# Patient Record
Sex: MALE | Race: WHITE | NOT HISPANIC OR LATINO | Employment: UNEMPLOYED | URBAN - METROPOLITAN AREA
[De-identification: names, ages, dates, MRNs, and addresses within clinical notes are randomized per-mention and may not be internally consistent; named-entity substitution may affect disease eponyms.]

---
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
## 2023-01-12 ENCOUNTER — HOSPITAL ENCOUNTER (EMERGENCY)
Facility: HOSPITAL | Age: 18
Discharge: HOME | End: 2023-01-12
Attending: EMERGENCY MEDICINE
Payer: COMMERCIAL

## 2023-01-12 VITALS
WEIGHT: 140 LBS | RESPIRATION RATE: 18 BRPM | BODY MASS INDEX: 20.04 KG/M2 | HEART RATE: 87 BPM | HEIGHT: 70 IN | TEMPERATURE: 98 F | OXYGEN SATURATION: 100 % | SYSTOLIC BLOOD PRESSURE: 122 MMHG | DIASTOLIC BLOOD PRESSURE: 78 MMHG

## 2023-01-12 DIAGNOSIS — T14.90XA INJURY: ICD-10-CM

## 2023-01-12 DIAGNOSIS — S62.306A CLOSED DISPLACED FRACTURE OF FIFTH METACARPAL BONE OF RIGHT HAND, UNSPECIFIED PORTION OF METACARPAL, INITIAL ENCOUNTER: Primary | ICD-10-CM

## 2023-01-12 PROCEDURE — 29125 APPL SHORT ARM SPLINT STATIC: CPT | Mod: RT,ER

## 2023-01-12 PROCEDURE — 25000003 PHARM REV CODE 250: Mod: ER | Performed by: NURSE PRACTITIONER

## 2023-01-12 PROCEDURE — 99284 EMERGENCY DEPT VISIT MOD MDM: CPT | Mod: 25,ER

## 2023-01-12 RX ORDER — IBUPROFEN 600 MG/1
600 TABLET ORAL
Status: COMPLETED | OUTPATIENT
Start: 2023-01-12 | End: 2023-01-12

## 2023-01-12 RX ORDER — OXYCODONE AND ACETAMINOPHEN 5; 325 MG/1; MG/1
1 TABLET ORAL EVERY 6 HOURS PRN
Qty: 12 TABLET | Refills: 0 | Status: SHIPPED | OUTPATIENT
Start: 2023-01-12 | End: 2023-01-15

## 2023-01-12 RX ORDER — IBUPROFEN 600 MG/1
600 TABLET ORAL EVERY 6 HOURS PRN
Qty: 20 TABLET | Refills: 0 | Status: SHIPPED | OUTPATIENT
Start: 2023-01-12 | End: 2023-01-17

## 2023-01-12 RX ADMIN — IBUPROFEN 600 MG: 600 TABLET ORAL at 03:01

## 2023-01-12 NOTE — ED PROVIDER NOTES
Encounter Date: 1/12/2023    SCRIBE #1 NOTE: I, Renate Mclaughlin, am scribing for, and in the presence of,  CLARA Epstein. I have scribed the following portions of the note - Other sections scribed: HPI, ROS, PE.     History     Chief Complaint   Patient presents with    Hand Injury     Pt states he punched door this morning now has right hand pain, no obvious signs of trauma noted      This 17 y.o male, with a medical history of Ulcerative Colitis, presents to the ED s/p a right hand injury that occurred this AM. Pt reports that he got into an argument with his mother this morning and punched a door with his right hand. He states that he has since been experiencing pain and swelling to the right hand (mainly to the 3rd, 4th and 5th digits). The symptoms are acute, constant and moderate (5/10). Patient denies rash, fever, chest pain, SOB, numbness, weakness, tingling, abdominal pain, back pain, dysuria, hematuria, nausea, vomiting, diarrhea, or any other complaints.  Patient has not taken any medications for the symptoms. No alleviating/aggravating factors. No known medication allergies. Of note, pt reports occasional alcohol use. No tobacco or drug use.  Patient is right hand dominent    The history is provided by the patient.   Review of patient's allergies indicates:  No Known Allergies  Past Medical History:   Diagnosis Date    Colitis, ulcerative      History reviewed. No pertinent surgical history.  Family History   Problem Relation Age of Onset    No Known Problems Mother     No Known Problems Father      Social History     Tobacco Use    Smoking status: Former    Smokeless tobacco: Never   Substance Use Topics    Alcohol use: No     Review of Systems   Constitutional:  Negative for chills, fatigue and fever.   HENT:  Negative for congestion, ear pain, rhinorrhea and sore throat.    Eyes:  Negative for pain, discharge and redness.   Respiratory:  Negative for cough and shortness of breath.     Cardiovascular:  Negative for chest pain.   Gastrointestinal:  Negative for abdominal pain, diarrhea, nausea and vomiting.   Genitourinary:  Negative for dysuria.   Musculoskeletal:  Positive for arthralgias. Negative for back pain, neck pain and neck stiffness.        (+) right hand pain (mainly to the 3rd, 4th and 5th digits)   Skin:  Negative for rash.   Neurological:  Negative for dizziness, weakness, light-headedness, numbness and headaches.   Psychiatric/Behavioral:  Negative for confusion.      Physical Exam     Initial Vitals [01/12/23 1315]   BP Pulse Resp Temp SpO2   117/76 99 20 98.2 °F (36.8 °C) 99 %      MAP       --         Physical Exam    Nursing note and vitals reviewed.  Constitutional: Vital signs are normal. He appears well-developed. He is cooperative.  Non-toxic appearance. He does not appear ill.   HENT:   Head: Normocephalic and atraumatic.   Right Ear: External ear normal.   Left Ear: External ear normal.   Nose: Nose normal.   Mouth/Throat: Oropharynx is clear and moist.   Eyes: Conjunctivae are normal.   Neck:   Normal range of motion.  Cardiovascular:  Normal rate and regular rhythm.           Pulses:       Radial pulses are 2+ on the right side.   Pulmonary/Chest: Effort normal and breath sounds normal. He exhibits no tenderness.   Abdominal: Abdomen is soft. There is no abdominal tenderness.   Musculoskeletal:      Cervical back: Normal range of motion.      Comments: There is tenderness at the base of the 3rd, 4th, and 5th metacarpals with swelling; no erythema, bruising, or rash; skin intact; Normal range of motion with pain; normal strength and sensation; +2 radial pulse; capillary refill < 2 seconds; normal right wrist; no snuffbox tenderness     Neurological: He is alert and oriented to person, place, and time. GCS eye subscore is 4. GCS verbal subscore is 5. GCS motor subscore is 6.   Normal strength and sensation.   Skin: Skin is warm, dry and intact. Capillary refill takes  less than 2 seconds. No rash noted.   No erythema, bruising, or rash. Skin is intact.   Psychiatric: He has a normal mood and affect. His speech is normal and behavior is normal. Judgment and thought content normal.       ED Course   Orthopedic Injury    Date/Time: 1/12/2023 4:43 PM  Performed by: CLARA Dunbar  Authorized by: Crystal Dorsey DO     Location procedure was performed:  [facility] EMERGENCY DEPARTMENT  Injury:     Injury location:  Hand    Location details:  Right hand    Injury type:  Fracture    Fracture type: fifth metacarpal        Pre-procedure assessment:     Neurovascular status: Neurovascularly intact      Distal perfusion: normal      Neurological function: normal      Range of motion: normal        Selections made in this section will also lock the Injury type section above.:     Immobilization:  Sling    Splint type:  Ulnar gutter    Supplies used:  Ortho-Glass    Complications: No      Estimated blood loss (mL):  0    Specimens: No      Implants: No    Post-procedure assessment:     Neurovascular status: Neurovascularly intact      Distal perfusion: normal      Neurological function: normal      Range of motion: splinted      Patient tolerance:  Patient tolerated the procedure well with no immediate complications  Labs Reviewed - No data to display       Imaging Results              X-Ray Wrist Complete Right (Final result)  Result time 01/12/23 15:46:53      Final result by Kamran Monsivais MD (01/12/23 15:46:53)                   Impression:      Proximal 5th metacarpal acute fracture, as above.      Electronically signed by: Kamran Monsivais MD  Date:    01/12/2023  Time:    15:46               Narrative:    EXAMINATION:  XR WRIST COMPLETE 3 VIEWS RIGHT; XR HAND COMPLETE 3 VIEW RIGHT    CLINICAL HISTORY:  injury; Injury, unspecified, initial encounter    TECHNIQUE:  PA, lateral, and oblique views of the right wrist and right hand were performed.    COMPARISON:  None    FINDINGS:  Bones are well  mineralized.  Acute appearing fracture with mild comminution and impaction involving the proximal metaphysis of the 5th metacarpal with slight impaction.  No definite fracture planes extending to the articular surface at the adjacent CMC joint.  There is mild dorsal apex angulation at the fracture site.  No dislocation or destructive osseous process.  Carpus is intact.  No subcutaneous emphysema or radiodense retained foreign body.  Dislocation or destructive osseous process.  Joint spaces appear relatively maintained.  No subcutaneous emphysema or radiodense retained foreign body.                                       X-Ray Hand 3 view Right (Final result)  Result time 01/12/23 15:46:53      Final result by Kamran Monsivais MD (01/12/23 15:46:53)                   Impression:      Proximal 5th metacarpal acute fracture, as above.      Electronically signed by: Kamran Monsivais MD  Date:    01/12/2023  Time:    15:46               Narrative:    EXAMINATION:  XR WRIST COMPLETE 3 VIEWS RIGHT; XR HAND COMPLETE 3 VIEW RIGHT    CLINICAL HISTORY:  injury; Injury, unspecified, initial encounter    TECHNIQUE:  PA, lateral, and oblique views of the right wrist and right hand were performed.    COMPARISON:  None    FINDINGS:  Bones are well mineralized.  Acute appearing fracture with mild comminution and impaction involving the proximal metaphysis of the 5th metacarpal with slight impaction.  No definite fracture planes extending to the articular surface at the adjacent CMC joint.  There is mild dorsal apex angulation at the fracture site.  No dislocation or destructive osseous process.  Carpus is intact.  No subcutaneous emphysema or radiodense retained foreign body.  Dislocation or destructive osseous process.  Joint spaces appear relatively maintained.  No subcutaneous emphysema or radiodense retained foreign body.                                       Medications   ibuprofen tablet 600 mg (600 mg Oral Given 1/12/23 2586)            APC / Resident Notes:   This is an urgent evaluation of a 17 y.o. male that presents to the Emergency Department for right hand injury.  The patient is a non-toxic, afebrile, and well appearing male. On physical exam, there is  tenderness at the base of the 3rd, 4th, and 5th metacarpals with swelling; no erythema, bruising, or rash; skin intact; Normal range of motion with pain; normal strength and sensation; +2 radial pulse; capillary refill < 2 seconds; normal right wrist; no snuffbox tenderness     Vital Signs: 122/78, 98.2, 87, 18, 100%  Lab\Radiology\Other Procedure RESULTS: Xray showed an acute appearing fracture with mild comminution and impaction involving the proximal metaphysis of the 5th metacarpal with slight impaction    Given the above findings, my overall impression is right 5th metacarpal fracture. Given the above findings, I do not think the patient has dislocation, cellulitis, abscess, laceration.    During his stay in the ED, the patient has been given Ibuprofen, splint, sling with good relief of his symptoms. The patient will be discharged home with percocet. Additional home care recommendations include Ibuprofen, Hydration. The diagnosis, treatment plan, instructions for follow-up, strict return precautions, and reevaluation with his Ortho as well as ED return precautions have been discussed with the patient and he has verbalized an understanding of the information.  All questions or concerns from the patient have been addressed.     This case was discussed with my attending Dr. Dorsey who is in agreement with my assessment and plan.     Scribe Attestation:   Scribe #1: I performed the above scribed service and the documentation accurately describes the services I performed. I attest to the accuracy of the note.                 I, JOSE ALEJANDRO Epstein, personally performed the services described in this documentation. All medical record entries made by the scribe were at my direction and in my  presence. I have reviewed the chart and agree that the record reflects my personal performance and is accurate and complete.   Clinical Impression:   Final diagnoses:  [T14.90XA] Injury  [S62.306A] Closed displaced fracture of fifth metacarpal bone of right hand, unspecified portion of metacarpal, initial encounter (Primary)        ED Disposition Condition    Discharge Stable          ED Prescriptions       Medication Sig Dispense Start Date End Date Auth. Provider    oxyCODONE-acetaminophen (PERCOCET) 5-325 mg per tablet Take 1 tablet by mouth every 6 (six) hours as needed for Pain. 12 tablet 1/12/2023 1/15/2023 CLARA Dunbar    ibuprofen (ADVIL,MOTRIN) 600 MG tablet Take 1 tablet (600 mg total) by mouth every 6 (six) hours as needed for Pain. 20 tablet 1/12/2023 1/17/2023 CLARA Dunbar          Follow-up Information       Follow up With Specialties Details Why Contact Info    Colton Johnston Jr., MD Hand Surgery, Orthopedic Surgery Schedule an appointment as soon as possible for a visit in 2 days  [address] [facility] [address]  [address]  [facility] [address]  [phone]      Brian Gutierres MD Orthopedic Surgery Schedule an appointment as soon as possible for a visit in 2 days  [address] [facility]  [address]  [facility] [address]  [phone]      [facility] ED Emergency Medicine Go to  If symptoms worsen [address] [facility] [address] [facility] [address] [phone]             CLARA Dunbar  01/12/23 5452

## 2023-01-12 NOTE — Clinical Note
"Stanton"Harley Fajardo was seen and treated in our emergency department on 1/12/2023.  He may return to work on 01/16/2023.       If you have any questions or concerns, please don't hesitate to call.      CLARA Dunbar"

## 2023-01-12 NOTE — DISCHARGE INSTRUCTIONS
§ Please return to the Emergency Department for any new or worsening symptoms including: fever, chest pain, shortness of breath, loss of consciousness, dizziness, weakness, or any other concerns.     § Schedule an appointment for follow up with Orthopedics as soon as possible for a recheck of your symptoms. If you do not have one, contact the one listed on your discharge paperwork or call the [facility] Appointment Desk at [phone] to schedule an appointment.     § If you require follow up care from a specialist and are unable to schedule an appointment with them directly, please contact your Primary Care Provider on the next business day to set up a referral.      § Please take all medication as prescribed.

## 2023-04-05 ENCOUNTER — LAB VISIT (OUTPATIENT)
Dept: LAB | Facility: HOSPITAL | Age: 18
End: 2023-04-05
Attending: PEDIATRICS
Payer: COMMERCIAL

## 2023-04-05 ENCOUNTER — TELEPHONE (OUTPATIENT)
Dept: PEDIATRIC GASTROENTEROLOGY | Facility: CLINIC | Age: 18
End: 2023-04-05
Payer: COMMERCIAL

## 2023-04-05 DIAGNOSIS — K51.90 ULCERATIVE COLITIS WITHOUT COMPLICATIONS, UNSPECIFIED LOCATION: ICD-10-CM

## 2023-04-05 PROCEDURE — 83993 ASSAY FOR CALPROTECTIN FECAL: CPT | Performed by: PEDIATRICS

## 2023-04-05 NOTE — TELEPHONE ENCOUNTER
----- Message from Clifton Parker sent at 4/5/2023 12:12 PM CDT -----  Regarding: call back  Pt's dad call to speak with someone in regards to pt's rx requesting call back     Call

## 2023-04-05 NOTE — TELEPHONE ENCOUNTER
James- Confirmed current order has 8 refills left but does need PA.  Call forwarded to Aleks Fink- Completed PA via telephone with Aleks.    Approved 4/5/2024  [id number]

## 2023-04-05 NOTE — TELEPHONE ENCOUNTER
Call returned to alonzo as requested.  Alonzo states he received a letter from [facility] stating they do not believe Stanton no longer needs to be on medication and that they will no longer cover.  Inquired if alonzo had a copy of letter.  Alonzo says he will look for it.  Instructed to send to e-mail once found.  Provided e-mail address.      Will also call pharmacy to inquire also.

## 2023-04-05 NOTE — TELEPHONE ENCOUNTER
Called dad to inform that PA was needed and was completed and approved.  Dad v/u and states he will get delivery initiated.  Dad denies any other questions.

## 2023-04-12 LAB — CALPROTECTIN STL-MCNT: <27.1 MCG/G

## 2023-04-19 ENCOUNTER — OFFICE VISIT (OUTPATIENT)
Dept: URGENT CARE | Facility: CLINIC | Age: 18
End: 2023-04-19
Payer: COMMERCIAL

## 2023-04-19 VITALS
HEIGHT: 70 IN | RESPIRATION RATE: 18 BRPM | BODY MASS INDEX: 20.04 KG/M2 | HEART RATE: 74 BPM | TEMPERATURE: 98 F | SYSTOLIC BLOOD PRESSURE: 105 MMHG | OXYGEN SATURATION: 96 % | WEIGHT: 140 LBS | DIASTOLIC BLOOD PRESSURE: 62 MMHG

## 2023-04-19 DIAGNOSIS — J02.0 STREP PHARYNGITIS: Primary | ICD-10-CM

## 2023-04-19 DIAGNOSIS — J02.9 SORE THROAT: ICD-10-CM

## 2023-04-19 LAB
CTP QC/QA: YES
MOLECULAR STREP A: POSITIVE

## 2023-04-19 PROCEDURE — 99213 OFFICE O/P EST LOW 20 MIN: CPT | Mod: S$GLB,,,

## 2023-04-19 PROCEDURE — 87651 POCT STREP A MOLECULAR: ICD-10-PCS | Mod: QW,S$GLB,,

## 2023-04-19 PROCEDURE — 87651 STREP A DNA AMP PROBE: CPT | Mod: QW,S$GLB,,

## 2023-04-19 PROCEDURE — 99213 PR OFFICE/OUTPT VISIT, EST, LEVL III, 20-29 MIN: ICD-10-PCS | Mod: S$GLB,,,

## 2023-04-19 RX ORDER — AMOXICILLIN 500 MG/1
500 TABLET, FILM COATED ORAL EVERY 12 HOURS
Qty: 20 TABLET | Refills: 0 | Status: SHIPPED | OUTPATIENT
Start: 2023-04-19 | End: 2023-04-29

## 2023-04-19 NOTE — PATIENT INSTRUCTIONS
Take amoxicillin twice a day for 10 days for strep throat.  Magic mouthwash swish and spit every 4 hours as needed for sore throat.    What care is needed at home?   Ask your doctor what you need to do when you go home. Make sure you ask questions if you do not understand what the doctor says. This way you will know what you need to do.  Gargle with warm salt water a few times daily. Mix 1/2 teaspoon (2.5 grams) salt with a cup (240 mL) of warm water.  Use a cool mist humidifier to keep your throat moist.  Drink lots of water, juice, or broth.  Suck on ice chips or throat lozenges to ease pain.  Stop smoking. Talk to your doctor if you need help quitting. Stay away from those who are smoking.  - Rest.    - Drink plenty of fluids.    - Acetaminophen (tylenol) or Ibuprofen (advil,motrin) as directed as needed for fever/pain. Avoid tylenol if you have a history of liver disease. Do not take ibuprofen if you have a history of GI bleeding, kidney disease, or if you take blood thinners.     - Follow up with your PCP or specialty clinic as directed in the next 1-2 weeks if not improved or as needed.  You can call [phone] to schedule an appointment with the appropriate provider.    - Go to the ER or seek medical attention immediately if you develop new or worsening symptoms.     - You must understand that you have received an Urgent Care treatment only and that you may be released before all of your medical problems are known or treated.   - You, the patient, will arrange for follow up care as instructed.   - If your condition worsens or fails to improve we recommend that you receive another evaluation at the ER immediately or contact your PCP to discuss your concerns or return here.

## 2023-04-19 NOTE — PROGRESS NOTES
"Subjective:      Patient ID: Stanton Fajardo is a 17 y.o. male.    Vitals:  height is 5' 10" (1.778 m) and weight is 63.5 kg (140 lb). His oral temperature is 98.3 °F (36.8 °C). His blood pressure is 105/62 and his pulse is 74. His respiration is 18 and oxygen saturation is 96%.     Chief Complaint: Sore Throat    17-year-old male patient reports of a sore through x2 days.  Patient reports it is painful to swallow.  Patient also complains of nasal congestion.  Patient reports he is taking Advil for pain.  Patient denies any fever, GI complaints, shortness of breath, chest pain or any other associated symptoms.    Sore Throat   This is a new problem. Episode onset: 2 days ago. The problem has been unchanged. There has been no fever. The pain is at a severity of 4/10. The pain is moderate. Pertinent negatives include no abdominal pain, congestion, coughing, diarrhea, drooling, ear discharge, ear pain, headaches, hoarse voice, plugged ear sensation, neck pain, shortness of breath, stridor, swollen glands, trouble swallowing or vomiting. He has had no exposure to strep or mono. Treatments tried: advil. The treatment provided moderate relief.     Constitution: Negative for activity change, appetite change, chills and sweating.   HENT:  Positive for sore throat. Negative for ear pain, ear discharge, foreign body in ear, tinnitus, hearing loss, drooling, congestion and trouble swallowing.    Neck: Positive for painful lymph nodes. Negative for neck pain and neck stiffness.   Cardiovascular:  Negative for chest trauma, chest pain, leg swelling and palpitations.   Eyes:  Negative for eye trauma, foreign body in eye, eye discharge and eye itching.   Respiratory:  Negative for sleep apnea, chest tightness, cough, shortness of breath and stridor.    Gastrointestinal:  Negative for abdominal trauma, abdominal pain, vomiting and diarrhea.   Endocrine: hair loss, cold intolerance and heat intolerance.   Genitourinary:  Negative " for dysuria, frequency, urgency and urine decreased.   Musculoskeletal:  Negative for pain, trauma, joint pain and joint swelling.   Skin:  Negative for color change, pale, rash and wound.   Allergic/Immunologic: Negative for environmental allergies, seasonal allergies, food allergies and eczema.   Neurological:  Negative for dizziness, history of vertigo, light-headedness, passing out, headaches, disorientation and altered mental status.   Hematologic/Lymphatic: Positive for swollen lymph nodes. Negative for easy bruising/bleeding and trouble clotting. Does not bruise/bleed easily.   Psychiatric/Behavioral:  Negative for altered mental status, disorientation, confusion and agitation.     Objective:     Physical Exam   Constitutional: He is oriented to person, place, and time. He appears well-developed. He is cooperative.  Non-toxic appearance. He does not appear ill. No distress.   HENT:   Head: Normocephalic and atraumatic.   Ears:   Right Ear: Hearing, tympanic membrane, external ear and ear canal normal.   Left Ear: Hearing, tympanic membrane, external ear and ear canal normal.   Nose: Nose normal. No mucosal edema, rhinorrhea or nasal deformity. No epistaxis. Right sinus exhibits no maxillary sinus tenderness and no frontal sinus tenderness. Left sinus exhibits no maxillary sinus tenderness and no frontal sinus tenderness.   Mouth/Throat: Uvula is midline and mucous membranes are normal. No trismus in the jaw. Normal dentition. No uvula swelling. Posterior oropharyngeal erythema present. No oropharyngeal exudate or posterior oropharyngeal edema.   Eyes: Conjunctivae and lids are normal. No scleral icterus.   Neck: Trachea normal and phonation normal. Neck supple. No edema present. No erythema present. No neck rigidity present.   Cardiovascular: Normal rate, regular rhythm, normal heart sounds and normal pulses.   Pulmonary/Chest: Effort normal and breath sounds normal. No respiratory distress. He has no  decreased breath sounds. He has no rhonchi.   Abdominal: Normal appearance.   Musculoskeletal: Normal range of motion.         General: No deformity. Normal range of motion.   Neurological: He is alert and oriented to person, place, and time. He exhibits normal muscle tone. Coordination normal.   Skin: Skin is warm, dry, intact, not diaphoretic and not pale.   Psychiatric: His speech is normal and behavior is normal. Judgment and thought content normal.   Nursing note and vitals reviewed.    Assessment:     1. Strep pharyngitis    2. Sore throat        Plan:     Results for orders placed or performed in visit on 04/19/23   POCT Strep A, Molecular   Result Value Ref Range    Molecular Strep A, POC Positive (A) Negative     Acceptable Yes       Patient Instructions   Take amoxicillin twice a day for 10 days for strep throat.  Magic mouthwash swish and spit every 4 hours as needed for sore throat.    What care is needed at home?   Ask your doctor what you need to do when you go home. Make sure you ask questions if you do not understand what the doctor says. This way you will know what you need to do.  Gargle with warm salt water a few times daily. Mix 1/2 teaspoon (2.5 grams) salt with a cup (240 mL) of warm water.  Use a cool mist humidifier to keep your throat moist.  Drink lots of water, juice, or broth.  Suck on ice chips or throat lozenges to ease pain.  Stop smoking. Talk to your doctor if you need help quitting. Stay away from those who are smoking.  - Rest.    - Drink plenty of fluids.    - Acetaminophen (tylenol) or Ibuprofen (advil,motrin) as directed as needed for fever/pain. Avoid tylenol if you have a history of liver disease. Do not take ibuprofen if you have a history of GI bleeding, kidney disease, or if you take blood thinners.     - Follow up with your PCP or specialty clinic as directed in the next 1-2 weeks if not improved or as needed.  You can call [phone] to schedule an  appointment with the appropriate provider.    - Go to the ER or seek medical attention immediately if you develop new or worsening symptoms.     - You must understand that you have received an Urgent Care treatment only and that you may be released before all of your medical problems are known or treated.   - You, the patient, will arrange for follow up care as instructed.   - If your condition worsens or fails to improve we recommend that you receive another evaluation at the ER immediately or contact your PCP to discuss your concerns or return here.    Strep pharyngitis  -     amoxicillin (AMOXIL) 500 MG Tab; Take 1 tablet (500 mg total) by mouth every 12 (twelve) hours. for 10 days  Dispense: 20 tablet; Refill: 0    Sore throat  -     POCT Strep A, Molecular  -     diphenhydrAMINE-aluminum-magnesium hydroxide-simethicone-LIDOcaine HCl 2%; Swish and spit 15 mLs every 4 (four) hours as needed (Sore throat). 220ml total  Dispense: 220 each; Refill: 0

## 2023-06-18 DIAGNOSIS — K51.90 ULCERATIVE COLITIS WITHOUT COMPLICATIONS, UNSPECIFIED LOCATION: ICD-10-CM

## 2023-06-19 NOTE — TELEPHONE ENCOUNTER
Refill request received for MTX, pt last seen in 9/2022. Called to offer scheduling f/u.  No answer, LVM.

## 2023-08-17 RX ORDER — METHOTREXATE 2.5 MG/1
TABLET ORAL
Qty: 12 TABLET | Refills: 11 | Status: SHIPPED | OUTPATIENT
Start: 2023-08-17

## 2023-08-23 NOTE — TELEPHONE ENCOUNTER
Call returned to alonzo informing him that Dr. Gonzalez would actually need an appointment to reschedule.  Alonzo v/u.  Offered the first available of 2/11 at 3PM and alonzo accepts.  Confirmed that insurance on file was correct. Alonzo states that they have a new pharmacy that is [facility].  Bin # - [id number] and PCN # - [id number].  Alonzo states his next injection is due on 2/10 so he will need a pen to tie him over.  Informed him that I will let Dr. Goznalez know and she will send it in for him.  Alonzo v/u   [phone]

## 2023-09-20 DIAGNOSIS — K51.90 ULCERATIVE COLITIS WITHOUT COMPLICATIONS, UNSPECIFIED LOCATION: ICD-10-CM

## 2023-09-26 ENCOUNTER — TELEPHONE (OUTPATIENT)
Dept: PEDIATRIC GASTROENTEROLOGY | Facility: CLINIC | Age: 18
End: 2023-09-26
Payer: COMMERCIAL

## 2023-09-26 RX ORDER — ADALIMUMAB 40MG/0.4ML
KIT SUBCUTANEOUS
Qty: 2 PEN | Refills: 3 | Status: SHIPPED | OUTPATIENT
Start: 2023-09-26 | End: 2024-01-05 | Stop reason: SDUPTHER

## 2023-09-26 NOTE — TELEPHONE ENCOUNTER
Kim Gonzalez MD P [facility] Il[facility] Staff  Caller: Unspecified (6 days ago,  3:43 AM)  Please schedule this patient to see me.   I refilled his Humira but I haven't seen him for a year.     Don't want him to miss a dose.         Attempted to call Stanton to schedule appt.  No answer, LVM with call back phone number to schedule.

## 2023-09-26 NOTE — TELEPHONE ENCOUNTER
----- Message from Liliam White sent at 9/26/2023  9:54 AM CDT -----  Contact: [phone]    Pharmacy is calling to clarify an RX.    [facility] [place] [address], [address] [address]  Phone: [phone] Fax: [phone]    RX name:  adalimumab (HUMIRA,CF, PEN) 40 mg/0.4 mL PnKt 2 pen     What do they need to clarify:  The pharmacy is requesting assistance with getting a refill on the     current prescription for the patient.    Comments: Please call the pharmacy back to advise.

## 2023-12-31 DIAGNOSIS — K51.90 ULCERATIVE COLITIS WITHOUT COMPLICATIONS, UNSPECIFIED LOCATION: ICD-10-CM

## 2024-01-05 DIAGNOSIS — K51.90 ULCERATIVE COLITIS WITHOUT COMPLICATIONS, UNSPECIFIED LOCATION: ICD-10-CM

## 2024-01-05 RX ORDER — ADALIMUMAB 40MG/0.4ML
KIT SUBCUTANEOUS
OUTPATIENT
Start: 2024-01-05

## 2024-01-05 NOTE — TELEPHONE ENCOUNTER
----- Message from Estee Luis sent at 1/5/2024  1:43 PM CST -----  Prescription refill request.  RX name and strength (copy/paste from chart):   adalimumab (HUMIRA,CF, PEN) 40 mg/0.4 mL PnKt    Is this a 30 day or 90 day RX:  30 days     Pharmacy name and phone # (copy/paste from chart):     [facility] - [address], [address], [address] [address]  Phone: [phone] Fax: [phone]          Additional information:   Angelito calling from the above pharmacy and she stated that pt needs a new Rx. Please call to advise.

## 2024-01-18 ENCOUNTER — OFFICE VISIT (OUTPATIENT)
Dept: URGENT CARE | Facility: CLINIC | Age: 19
End: 2024-01-18
Payer: COMMERCIAL

## 2024-01-18 VITALS
SYSTOLIC BLOOD PRESSURE: 98 MMHG | DIASTOLIC BLOOD PRESSURE: 63 MMHG | WEIGHT: 140 LBS | BODY MASS INDEX: 20.04 KG/M2 | RESPIRATION RATE: 18 BRPM | HEIGHT: 70 IN | OXYGEN SATURATION: 98 % | HEART RATE: 72 BPM | TEMPERATURE: 98 F

## 2024-01-18 DIAGNOSIS — M54.2 NECK PAIN: Primary | ICD-10-CM

## 2024-01-18 DIAGNOSIS — S16.1XXA NECK STRAIN, INITIAL ENCOUNTER: ICD-10-CM

## 2024-01-18 PROCEDURE — 99213 OFFICE O/P EST LOW 20 MIN: CPT | Mod: S$GLB,,, | Performed by: FAMILY MEDICINE

## 2024-01-18 RX ORDER — NAPROXEN 500 MG/1
500 TABLET ORAL 2 TIMES DAILY WITH MEALS
Qty: 20 TABLET | Refills: 0 | Status: SHIPPED | OUTPATIENT
Start: 2024-01-18 | End: 2024-01-28

## 2024-01-18 RX ORDER — METHOCARBAMOL 500 MG/1
500 TABLET, FILM COATED ORAL 3 TIMES DAILY
Qty: 20 TABLET | Refills: 0 | Status: SHIPPED | OUTPATIENT
Start: 2024-01-18 | End: 2024-01-25

## 2024-01-18 NOTE — PROGRESS NOTES
"Subjective:      Patient ID: Stanton Fajardo is a 18 y.o. male.    Vitals:  height is 5' 10" (1.778 m) and weight is 63.5 kg (140 lb). His temperature is 98.2 °F (36.8 °C). His blood pressure is 98/63 and his pulse is 72. His respiration is 18 and oxygen saturation is 98%.     Chief Complaint: Neck Injury    Pt presents complaints of a neck pain on neck movements. Pt states he was helping his dad install plywood on the celing his dad walked away to go get the nail gun leaving him holding the plywood by hisself and it was too heavy so he rested it ontop of his head and believes he caused a strain in his neck. Pain level 5 increased with movements.  Denies any radiating pain to arms.  Denies any headache, dizziness, nausea/vomiting, weakness or loss of sensations.     Neck Injury   This is a new problem. The current episode started in the past 7 days. The problem occurs constantly. The pain is associated with lifting a heavy object. The pain is present in the midline. The quality of the pain is described as shooting. The pain is at a severity of 6/10. The pain is moderate. The symptoms are aggravated by bending and twisting. The pain is Same all the time. Stiffness is present All day. Pertinent negatives include no chest pain, fever, headaches, leg pain, numbness, pain with swallowing, paresis, photophobia, syncope, tingling, trouble swallowing, visual change, weakness or weight loss. He has tried NSAIDs for the symptoms. The treatment provided no relief.     Constitution: Negative for fever.   HENT:  Negative for trouble swallowing.    Cardiovascular:  Negative for chest pain and passing out.   Eyes:  Negative for photophobia.   Neurological:  Negative for headaches and numbness.      Objective:     Physical Exam   Constitutional: He is oriented to person, place, and time. He appears well-developed. He is cooperative.   HENT:   Head: Normocephalic and atraumatic.   Ears:   Right Ear: Hearing, tympanic membrane, " external ear and ear canal normal. impacted cerumen  Left Ear: Hearing, tympanic membrane, external ear and ear canal normal. impacted cerumen  Nose: Nose normal. No mucosal edema or nasal deformity. No epistaxis. Right sinus exhibits no maxillary sinus tenderness and no frontal sinus tenderness. Left sinus exhibits no maxillary sinus tenderness and no frontal sinus tenderness.   Mouth/Throat: Uvula is midline, oropharynx is clear and moist and mucous membranes are normal. No trismus in the jaw. Normal dentition. No uvula swelling.   Eyes: Conjunctivae and lids are normal.   Neck: Trachea normal and phonation normal. Neck supple. No neck rigidity present.   Cardiovascular: Normal rate, regular rhythm, normal heart sounds and normal pulses.   No murmur heard.  Pulmonary/Chest: Effort normal and breath sounds normal. No stridor. No respiratory distress. He has no wheezes. He has no rhonchi. He has no rales.   Abdominal: Normal appearance and bowel sounds are normal. He exhibits no distension. Soft. There is no abdominal tenderness.   Musculoskeletal: Normal range of motion.         General: Normal range of motion.      Cervical back: He exhibits tenderness.      Comments:   Positive mild posterior neck muscle in right trapezius tenderness.  No redness, swelling, bruise, abrasion.    ROM mildly decreased secondary to pain.  No sensory/motor deficit.   Lymphadenopathy:     He has no cervical adenopathy.   Neurological: He is alert, oriented to person, place, and time and at baseline. He displays no weakness. No cranial nerve deficit or sensory deficit. He exhibits normal muscle tone.   Skin: Skin is warm, dry and intact.   Psychiatric: His speech is normal and behavior is normal. Mood, judgment and thought content normal.   Nursing note and vitals reviewed.      Assessment:     1. Neck pain    2. Neck strain, initial encounter        Plan:   Discussed exam findings/results/diagnosis/plan with patient in clinic. Advised  to f/u with PCP within 2-5 days. ER precautions given if symptoms get any worse. All questions answered. Patient verbally understood and agreed with treatment plan.     Neck pain    Neck strain, initial encounter    Other orders  -     naproxen (NAPROSYN) 500 MG tablet; Take 1 tablet (500 mg total) by mouth 2 (two) times daily with meals. for 10 days  Dispense: 20 tablet; Refill: 0  -     methocarbamoL (ROBAXIN) 500 MG Tab; Take 1 tablet (500 mg total) by mouth 3 (three) times daily. As needed for muscle spasm. May cause drowsiness for 7 days  Dispense: 20 tablet; Refill: 0

## 2024-01-25 RX ORDER — ADALIMUMAB 40MG/0.4ML
KIT SUBCUTANEOUS
Qty: 2 PEN | Refills: 3 | Status: SHIPPED | OUTPATIENT
Start: 2024-01-25

## 2024-10-21 ENCOUNTER — TELEPHONE (OUTPATIENT)
Dept: PEDIATRIC GASTROENTEROLOGY | Facility: CLINIC | Age: 19
End: 2024-10-21
Payer: COMMERCIAL

## 2024-10-21 NOTE — TELEPHONE ENCOUNTER
----- Message from Med Assistant Blanc sent at 10/21/2024 10:39 AM CDT -----  Contact: [phone]@ [phone]  Pt called                Pt is requesting a call back from provider or staff in regard to discuss past coloscopy.    Called to speak with Maxine per call back request. Pt requested records from previous scope done. Informed that I was not able to view- results or report on scope done, chart is showing that pt had an office visit on 9/29/2022 with Dr. Gonzalez where a case request was placed but unable to see and view reports. The number to medical records at [phone] was  provided. Pt v/u and further stated that he would call [facility] as well to see if they have the records or if the scope was done there.